# Patient Record
(demographics unavailable — no encounter records)

---

## 2024-10-30 NOTE — DATA REVIEWED
[MRI] : MRI [Lumbar Spine] : lumbar spine [Report was reviewed and noted in the chart] : The report was reviewed and noted in the chart [I independently reviewed and interpreted images and report] : I independently reviewed and interpreted images and report [FreeTextEntry1] : 8/6/2024 MRI Lumbar Spine O&C L2-L3: diffuse disc bulging, mild facet hypertrophy, mild bilateral NF narrowing L3-L4: diffuse disc bulging, bony ridging, broad based posterior disc herniation asymmetric to the left, bilateral facet hypertrophy, mild central stenosis, left > right NF narrowing, LEFT > right L3 nerve root impingement, progressed from previous study L4-L5: diffuse disc bulging, broad based posterior disc herniation asymmetric to right w/ bilateral facet hypertrophy, mild central stenosis, RIGHT > left L5 nerve root impingement, RIGHT > left L4 nerve root impingement with right > left NF stenosis L5-S1: disc bulging, bony ridging, broad based posterior disc herniation impinging on the RIGHT > left S1 nerve roots and RIGHT > left L5 nerve root impingement with right > left NF narrowing

## 2024-10-30 NOTE — PHYSICAL EXAM
[de-identified] : Gen: NAD Head: NC/AT Eyes: wears glasses, no scleral icterus ENT: mucous membranes moist CV: No JVD Lungs: nonlabored breathing Abd: soft, NT/ND Ext: full ROM in all extremities, no peripheral edema Back: +TTP in the right low lumbar facet region, +facet loading on the right, +SLR on the right Neuro: CN intact LEs +5 L +5 R hip flexion +5 L +5 R leg extension +5 L +5 R leg flexion +5 L +5 R foot dorsiflexion +5 L +5 R foot plantarflexion +5 L +5 R EHL extension Psych: normal affect Skin: no visible lesions

## 2024-10-30 NOTE — DISCUSSION/SUMMARY
[de-identified] : ROSALIO KENNEY is a 55 year-old man presenting for a RPV for a history of chronic low back pain.    Prior treatment: 10/16/2024: RIGHT L3-L4, L4-L5 TFESI w/o MAC w/ 90% improvement for 10-12 days, now 50% improvement  Physical therapy x2 months Lumbar TATIANA Meloxicam Oral steroid taper Patient has participated and failed at least 6 weeks of conservative therapy including physical therapy and a prescribed home exercise program as well as 6 weeks of activity modifications, including heat, ice, rest, and over the counter medications.  Plan: 1) MRI lumbar spine images reviewed with the patient. 2) Schedule L4-L5 ILESI w/o MAC. The procedure was explained to the patient in detail. Reviewed risks, benefits, and alternatives with the patient. Some risks discussed included temporary increase in pain, bleeding, infection, and side effects from steroids. The patient expressed understanding and would like to proceed. 3) Continue acetaminophen prn 4) Continue home exercise regimen 5) RTC post procedure

## 2024-10-30 NOTE — DISCUSSION/SUMMARY
[de-identified] : ROSALIO KENNEY is a 55 year-old man presenting for a RPV for a history of chronic low back pain.    Prior treatment: 10/16/2024: RIGHT L3-L4, L4-L5 TFESI w/o MAC w/ 90% improvement for 10-12 days, now 50% improvement  Physical therapy x2 months Lumbar TATIANA Meloxicam Oral steroid taper Patient has participated and failed at least 6 weeks of conservative therapy including physical therapy and a prescribed home exercise program as well as 6 weeks of activity modifications, including heat, ice, rest, and over the counter medications.  Plan: 1) MRI lumbar spine images reviewed with the patient. 2) Schedule L4-L5 ILESI w/o MAC. The procedure was explained to the patient in detail. Reviewed risks, benefits, and alternatives with the patient. Some risks discussed included temporary increase in pain, bleeding, infection, and side effects from steroids. The patient expressed understanding and would like to proceed. 3) Continue acetaminophen prn 4) Continue home exercise regimen 5) RTC post procedure

## 2024-10-30 NOTE — PHYSICAL EXAM
[de-identified] : Gen: NAD Head: NC/AT Eyes: wears glasses, no scleral icterus ENT: mucous membranes moist CV: No JVD Lungs: nonlabored breathing Abd: soft, NT/ND Ext: full ROM in all extremities, no peripheral edema Back: +TTP in the right low lumbar facet region, +facet loading on the right, +SLR on the right Neuro: CN intact LEs +5 L +5 R hip flexion +5 L +5 R leg extension +5 L +5 R leg flexion +5 L +5 R foot dorsiflexion +5 L +5 R foot plantarflexion +5 L +5 R EHL extension Psych: normal affect Skin: no visible lesions

## 2024-10-30 NOTE — HISTORY OF PRESENT ILLNESS
[Lower back] : lower back [Left Leg] : left leg [Right Leg] : right leg [5] : 5 [Burning] : burning [Dull/Aching] : dull/aching [Radiating] : radiating [Sharp] : sharp [Tightness] : tightness [Tingling] : tingling [Intermittent] : intermittent [Household chores] : household chores [Leisure] : leisure [Sleep] : sleep [Nothing helps with pain getting better] : Nothing helps with pain getting better [Sitting] : sitting [Standing] : standing [Physical therapy] : physical therapy [3] : 3 [FreeTextEntry1] : 10/30/2024: ROSALIO KENNEY is a 55 year-old man presenting for a RPV for a history of chronic low back pain. The patient underwent a RIGHT L3-L4, L4-L5 TFESI w/o MAC on 10/16/2024. Patient notes 90% improvement of the pain for 10-12 days but notes that the pain has returned over the past few days. Still notes pain and numbness in the right anterolateral thigh. Still has numbness over this distribution.  The patient states that average pain over the past week was 3/10 in severity. Mood: No depression or anxiety.  Sleep: Patient notes difficulty with sleep initiation and maintenance 2/2 pain.  9/13/2024 ROSALIO KENNEY is a 55 year-old man presenting for a NPV for a history of chronic low back pain. The patient notes a 25+ year history of low back pain with RLE radicular symptoms. The patient notes that pain has gotten progressively worse over time. At this point, the patient endorses a right sided low back pain with radiation to the right anterolateral thigh and anterior leg to the great toe. He notes numbness and tingling over this distribution. No focal weakness, bowel or bladder incontinence or saddle anesthesia. Pain is worse with sitting for long periods and laying down.  The patient states that average pain over the past week was 4/10 in severity. Mood: No depression or anxiety.  Sleep: Patient notes difficulty with sleep initiation and maintenance 2/2 pain. He has to sleep on a couch.     [] : no [FreeTextEntry6] : numbness and tingling in b/l legs  [FreeTextEntry7] : b/l legs  [de-identified] : x-ray and MRI [TWNoteComboBox1] : 0%

## 2024-10-30 NOTE — HISTORY OF PRESENT ILLNESS
[Lower back] : lower back [Left Leg] : left leg [Right Leg] : right leg [5] : 5 [Burning] : burning [Dull/Aching] : dull/aching [Radiating] : radiating [Sharp] : sharp [Tightness] : tightness [Tingling] : tingling [Intermittent] : intermittent [Household chores] : household chores [Leisure] : leisure [Sleep] : sleep [Nothing helps with pain getting better] : Nothing helps with pain getting better [Sitting] : sitting [Standing] : standing [Physical therapy] : physical therapy [3] : 3 [FreeTextEntry1] : 10/30/2024: ROSALIO KENNEY is a 55 year-old man presenting for a RPV for a history of chronic low back pain. The patient underwent a RIGHT L3-L4, L4-L5 TFESI w/o MAC on 10/16/2024. Patient notes 90% improvement of the pain for 10-12 days but notes that the pain has returned over the past few days. Still notes pain and numbness in the right anterolateral thigh. Still has numbness over this distribution.  The patient states that average pain over the past week was 3/10 in severity. Mood: No depression or anxiety.  Sleep: Patient notes difficulty with sleep initiation and maintenance 2/2 pain.  9/13/2024 ROSALIO KENNEY is a 55 year-old man presenting for a NPV for a history of chronic low back pain. The patient notes a 25+ year history of low back pain with RLE radicular symptoms. The patient notes that pain has gotten progressively worse over time. At this point, the patient endorses a right sided low back pain with radiation to the right anterolateral thigh and anterior leg to the great toe. He notes numbness and tingling over this distribution. No focal weakness, bowel or bladder incontinence or saddle anesthesia. Pain is worse with sitting for long periods and laying down.  The patient states that average pain over the past week was 4/10 in severity. Mood: No depression or anxiety.  Sleep: Patient notes difficulty with sleep initiation and maintenance 2/2 pain. He has to sleep on a couch.     [] : no [FreeTextEntry6] : numbness and tingling in b/l legs  [FreeTextEntry7] : b/l legs  [de-identified] : x-ray and MRI [TWNoteComboBox1] : 0%

## 2024-11-20 NOTE — PROCEDURE
[FreeTextEntry1] : L4-L5 lumbar interlaminar epidural steroid injection [FreeTextEntry2] : chronic lumbar radiculopathy  [FreeTextEntry3] : Procedure Date: 11/20/2024   Preoperative Diagnosis: chronic low back pain with bilateral sciatica   Procedure: L4-L5 epidural steroid injection under fluoroscopic guidance   Anesthesia: local   Complications: none   EBL: none   Procedure in detail: Patient was seen and examined. Risks, benefits, and alternatives for the procedure were discussed with the patient in detail. The patient expressed understanding, gave written and verbal consent, and placed themselves in a prone position on the procedure table. Skin overlying the lumbosacral spine was prepped with chloraprep and draped in the usual sterile fashion. Fluoroscopic images were obtained to identify the L4 and L5 vertebral body. Target was the interlaminar space between the L4 and L5 vertebral body. Skin overlying the target was marked and infiltrated with 1% lidocaine. Using an 20 gauge, 9cm Tuohy needle, this was inserted and advanced under intermittent fluoroscopic guidance. When felt to be engaged in the epidural space, lateral view was used to confirm depth. Needle was advanced until loss of resistance to saline was achieved. After negative aspiration for heme/csf, contrast was injected under live fluoroscopy, which showed contrast spread consistent with epidural flow. No evidence of intravascular or intrathecal uptake. At this point, a total of 3ml of injectate, which consisted of 1ml of 80mg/ml depomedrol and 2ml of normal saline were injected. The needle was restyletted and withdrawn, a band aid was placed over the injection site. Patient tolerated the procedure well. The patient recovered uneventfully and was discharged home in stable condition.

## 2024-12-06 NOTE — HISTORY OF PRESENT ILLNESS
[Lower back] : lower back [3] : 3 [Sitting] : sitting [Lying in bed] : lying in bed [de-identified] : 12/6/24: patient is here for a follow up of lower back pain. They have had two epidural injection in the back. The first set had improvement but wore off and then did another set of epidurals and had no significant change. The pain has not improved.   8/21/24- Lumbar MRI f/u.  08/02/2024: Returning today with worsening lower back pain. Completed PT in the past with some relief at the time. No relief with Meloxicam. Reports constant numbness of RT thigh and occasionally in LT thigh.   8/10/23: MRI results today, has not started PT yet. Pain improved slightly with medications .  7/24/23: 55yo male presents today with complaints of low back tightness/pain with anterior thigh numbness with right radicular pain. Patinet also reports intermittent LLE radicular pain/numbness which is more positional in nature improved with adjustment.  Patient reports symptoms have been present for several years, with no acute injury/trauma. Patient reports symptoms worsen when supine and limits sleep, also has difficulty with sitting.  Patient has gong to chiro, pain management receiving injections with minimal relief and Taking ibuprofen with some relief. Denies changes in b/b function.  Tried 1 TATIANA years ago without any relief.    3 weeks had a flare up was taking ibuprofen.  Has tried TEns.  PmHx: BPH, Asthma  Occupation:   Xrays completed L 4V L4/5, L5/S1 disc narrowing stable from 2023    [] : no

## 2024-12-06 NOTE — ASSESSMENT
[FreeTextEntry1] : 54 M with Low back pain and BLLE radiculopathy with severe foraminal stenosis on MRI from 2016 Discussed treatment options including PT, nsaids, muscle relaxants injections surgery. Pain worsening now moving into left leg.   MRI with L4-5 and L5-s1 Disc height also foraminal stenosis and lateral recess stenosis Pain management referral for Laz consider L3-s1 laminectomy L4-S1 TLIF if pain/numbness persists  RLE radic partially improved after EIS Consider MBB or repeat TF LAZ Also possible candidate for intracept  FU 6 weeks

## 2024-12-06 NOTE — DATA REVIEWED
[MRI] : MRI [Lumbar Spine] : lumbar spine [I independently reviewed and interpreted images and report] : I independently reviewed and interpreted images and report [FreeTextEntry1] : Multilevel degen changes with worst at R L5-s1 HNP [FreeTextEntry2] : FUll report in chart

## 2024-12-06 NOTE — IMAGING
[de-identified] : MRI form 2016 with mulitlevel degen changes sever foraminal stenosis at L4-5 and L5-S1.

## 2024-12-31 NOTE — HISTORY OF PRESENT ILLNESS
[Lower back] : lower back [Left Leg] : left leg [Right Leg] : right leg [3] : 3 [Burning] : burning [Dull/Aching] : dull/aching [Radiating] : radiating [Sharp] : sharp [Tightness] : tightness [Tingling] : tingling [Intermittent] : intermittent [Household chores] : household chores [Leisure] : leisure [Sleep] : sleep [Nothing helps with pain getting better] : Nothing helps with pain getting better [Sitting] : sitting [Standing] : standing [Physical therapy] : physical therapy [5] : 5 [FreeTextEntry1] : 12/31/2024 ROSALIO KENNEY is a 55 year-old man presenting for a RPV for a history of chronic low back pain. On 11/20/2024, the patient underwent an L4-L5 LESI w/o MAC. Notes moderate improvement of pain since the procedure. He continues to have a right-sided low back pain with radiation to the medial aspect of the right thigh.  The patient states that average pain over the past week was 4/10 in severity. Mood: No depression or anxiety.  Sleep: Patient notes difficulty with sleep initiation and maintenance 2/2 pain.  10/30/2024: ROSALIO KENNEY is a 55 year-old man presenting for a RPV for a history of chronic low back pain. The patient underwent a RIGHT L3-L4, L4-L5 TFESI w/o MAC on 10/16/2024. Patient notes 90% improvement of the pain for 10-12 days but notes that the pain has returned over the past few days. Still notes pain and numbness in the right anterolateral thigh. Still has numbness over this distribution.  The patient states that average pain over the past week was 3/10 in severity. Mood: No depression or anxiety.  Sleep: Patient notes difficulty with sleep initiation and maintenance 2/2 pain.  9/13/2024 ROSALIO KENNEY is a 55 year-old man presenting for a NPV for a history of chronic low back pain. The patient notes a 25+ year history of low back pain with RLE radicular symptoms. The patient notes that pain has gotten progressively worse over time. At this point, the patient endorses a right sided low back pain with radiation to the right anterolateral thigh and anterior leg to the great toe. He notes numbness and tingling over this distribution. No focal weakness, bowel or bladder incontinence or saddle anesthesia. Pain is worse with sitting for long periods and laying down.  The patient states that average pain over the past week was 4/10 in severity. Mood: No depression or anxiety.  Sleep: Patient notes difficulty with sleep initiation and maintenance 2/2 pain. He has to sleep on a couch.     [] : no [FreeTextEntry7] : b/l legs  [FreeTextEntry6] : numbness and tingling in b/l legs  [de-identified] : x-ray and MRI [TWNoteComboBox1] : 0%

## 2024-12-31 NOTE — PHYSICAL EXAM
[de-identified] : Gen: NAD Head: NC/AT Eyes: wears glasses, no scleral icterus ENT: mucous membranes moist CV: No JVD Lungs: nonlabored breathing Abd: soft, NT/ND Ext: full ROM in all extremities, no peripheral edema Back: +TTP in the right low lumbar facet region, +facet loading on the right, +SLR on the right Neuro: CN intact LEs +5 L +5 R hip flexion +5 L +5 R leg extension +5 L +5 R leg flexion +5 L +5 R foot dorsiflexion +5 L +5 R foot plantarflexion +5 L +5 R EHL extension Psych: normal affect Skin: no visible lesions

## 2024-12-31 NOTE — DISCUSSION/SUMMARY
[de-identified] : ROSALIO KENNEY is a 55 year-old man presenting for a RPV for a history of chronic low back pain.    Prior treatment: 11/20/2024 L4-L5 LESI w/o MAC w/ modest improvement  10/16/2024: RIGHT L3-L4, L4-L5 TFESI w/o MAC w/ 90% improvement for 10-12 days, now 50% improvement  Physical therapy x2 months Lumbar TATIANA Meloxicam Oral steroid taper Patient has participated and failed at least 6 weeks of conservative therapy including physical therapy and a prescribed home exercise program as well as 6 weeks of activity modifications, including heat, ice, rest, and over the counter medications.  Plan: 1) MRI lumbar spine images reviewed with the patient. 2) Schedule RIGHT L3-L4, L4-L5 TFESI w/o MAC. The procedure was explained to the patient in detail. Reviewed risks, benefits, and alternatives with the patient. Some risks discussed included temporary increase in pain, bleeding, infection, and side effects from steroids. The patient expressed understanding and would like to proceed. 3) Continue acetaminophen prn 4) Continue home exercise regimen 5) RTC post procedure

## 2024-12-31 NOTE — HISTORY OF PRESENT ILLNESS
[Lower back] : lower back [Left Leg] : left leg [Right Leg] : right leg [3] : 3 [Burning] : burning [Dull/Aching] : dull/aching [Radiating] : radiating [Sharp] : sharp [Tightness] : tightness [Tingling] : tingling [Intermittent] : intermittent [Household chores] : household chores [Leisure] : leisure [Sleep] : sleep [Nothing helps with pain getting better] : Nothing helps with pain getting better [Sitting] : sitting [Standing] : standing [Physical therapy] : physical therapy [5] : 5 [FreeTextEntry1] : 12/31/2024 ROSALIO KENNEY is a 55 year-old man presenting for a RPV for a history of chronic low back pain. On 11/20/2024, the patient underwent an L4-L5 LESI w/o MAC. Notes moderate improvement of pain since the procedure. He continues to have a right-sided low back pain with radiation to the medial aspect of the right thigh.  The patient states that average pain over the past week was 4/10 in severity. Mood: No depression or anxiety.  Sleep: Patient notes difficulty with sleep initiation and maintenance 2/2 pain.  10/30/2024: ROSALIO KENNEY is a 55 year-old man presenting for a RPV for a history of chronic low back pain. The patient underwent a RIGHT L3-L4, L4-L5 TFESI w/o MAC on 10/16/2024. Patient notes 90% improvement of the pain for 10-12 days but notes that the pain has returned over the past few days. Still notes pain and numbness in the right anterolateral thigh. Still has numbness over this distribution.  The patient states that average pain over the past week was 3/10 in severity. Mood: No depression or anxiety.  Sleep: Patient notes difficulty with sleep initiation and maintenance 2/2 pain.  9/13/2024 ROSALIO KENNEY is a 55 year-old man presenting for a NPV for a history of chronic low back pain. The patient notes a 25+ year history of low back pain with RLE radicular symptoms. The patient notes that pain has gotten progressively worse over time. At this point, the patient endorses a right sided low back pain with radiation to the right anterolateral thigh and anterior leg to the great toe. He notes numbness and tingling over this distribution. No focal weakness, bowel or bladder incontinence or saddle anesthesia. Pain is worse with sitting for long periods and laying down.  The patient states that average pain over the past week was 4/10 in severity. Mood: No depression or anxiety.  Sleep: Patient notes difficulty with sleep initiation and maintenance 2/2 pain. He has to sleep on a couch.     [] : no [FreeTextEntry7] : b/l legs  [FreeTextEntry6] : numbness and tingling in b/l legs  [de-identified] : x-ray and MRI [TWNoteComboBox1] : 0%

## 2024-12-31 NOTE — DISCUSSION/SUMMARY
[de-identified] : ROSALIO KENNEY is a 55 year-old man presenting for a RPV for a history of chronic low back pain.    Prior treatment: 11/20/2024 L4-L5 LESI w/o MAC w/ modest improvement  10/16/2024: RIGHT L3-L4, L4-L5 TFESI w/o MAC w/ 90% improvement for 10-12 days, now 50% improvement  Physical therapy x2 months Lumbar TATIANA Meloxicam Oral steroid taper Patient has participated and failed at least 6 weeks of conservative therapy including physical therapy and a prescribed home exercise program as well as 6 weeks of activity modifications, including heat, ice, rest, and over the counter medications.  Plan: 1) MRI lumbar spine images reviewed with the patient. 2) Schedule RIGHT L3-L4, L4-L5 TFESI w/o MAC. The procedure was explained to the patient in detail. Reviewed risks, benefits, and alternatives with the patient. Some risks discussed included temporary increase in pain, bleeding, infection, and side effects from steroids. The patient expressed understanding and would like to proceed. 3) Continue acetaminophen prn 4) Continue home exercise regimen 5) RTC post procedure

## 2024-12-31 NOTE — PHYSICAL EXAM
[de-identified] : Gen: NAD Head: NC/AT Eyes: wears glasses, no scleral icterus ENT: mucous membranes moist CV: No JVD Lungs: nonlabored breathing Abd: soft, NT/ND Ext: full ROM in all extremities, no peripheral edema Back: +TTP in the right low lumbar facet region, +facet loading on the right, +SLR on the right Neuro: CN intact LEs +5 L +5 R hip flexion +5 L +5 R leg extension +5 L +5 R leg flexion +5 L +5 R foot dorsiflexion +5 L +5 R foot plantarflexion +5 L +5 R EHL extension Psych: normal affect Skin: no visible lesions

## 2025-02-04 NOTE — PHYSICAL EXAM
[General Appearance - Well Developed] : well developed [General Appearance - Well Nourished] : well nourished [Normal Appearance] : normal appearance [Well Groomed] : well groomed [General Appearance - In No Acute Distress] : no acute distress [Abdomen Tenderness] : non-tender [Abdomen Soft] : soft [Costovertebral Angle Tenderness] : no ~M costovertebral angle tenderness [Urethral Meatus] : meatus normal [Penis Abnormality] : normal circumcised penis [Scrotum] : the scrotum was normal [Epididymis] : the epididymides were normal [Testes Tenderness] : no tenderness of the testes [Testes Mass (___cm)] : there were no testicular masses [Anus Abnormality] : the anus and perineum were normal [Rectal Exam - Rectum] : digital rectal exam was normal [Prostate Tenderness] : the prostate was not tender [No Prostate Nodules] : no prostate nodules [Prostate Size ___ (0-4)] : prostate size [unfilled] (scale: 0-4)

## 2025-02-04 NOTE — PHYSICAL EXAM
[General Appearance - Well Developed] : well developed [General Appearance - Well Nourished] : well nourished [Normal Appearance] : normal appearance [Well Groomed] : well groomed [General Appearance - In No Acute Distress] : no acute distress [Abdomen Soft] : soft [Abdomen Tenderness] : non-tender [Costovertebral Angle Tenderness] : no ~M costovertebral angle tenderness [Urethral Meatus] : meatus normal [Penis Abnormality] : normal circumcised penis [Scrotum] : the scrotum was normal [Epididymis] : the epididymides were normal [Testes Tenderness] : no tenderness of the testes [Anus Abnormality] : the anus and perineum were normal [Testes Mass (___cm)] : there were no testicular masses [Rectal Exam - Rectum] : digital rectal exam was normal [Prostate Tenderness] : the prostate was not tender [No Prostate Nodules] : no prostate nodules [Prostate Size ___ (0-4)] : prostate size [unfilled] (scale: 0-4)

## 2025-02-05 NOTE — ASSESSMENT
[FreeTextEntry1] : 55 year old male with LUTS here today for TRUS. Nocturia 3x. Denies hematuria, dysuria, flank pain, or any other urinary issues LIAM Occasionally eating spicy, citrus, chocolate. Adequately hydrating   FHx of PCA: negative Tobacco use: negative Last PSA: 1.09ng/mL (07/30/2024) Last creatinine: 1.24mg/dL (07/30/2024) Last UCx: negative (01/22/2024) Uro meds: Flomax 0.4, Dutasteride  07/30/2024 - TRUS Prostate Volume: 51.6 cc Length : 54.2 mm Width : 49.5 mm Height : 36.8 mm Impression: enlarged prostate gland.  02/05/2025 - TRUS Prostate Volume: 45 cc Length : 47 mm Width : 48 mm Height : 38 mm Impression: decreased in size from prior US  New TRUS is discussed with the patient. Meds are renewed for 6 months. Will F/U in 6 months with TRUS

## 2025-02-05 NOTE — HISTORY OF PRESENT ILLNESS
[FreeTextEntry1] : 55 year old male with LUTS here today for TRUS. Nocturia 3x. Denies hematuria, dysuria, flank pain, or any other urinary issues LIAM Occasionally eating spicy, citrus, chocolate. Adequately hydrating   FHx of PCA: negative Tobacco use: negative Last PSA: 1.09ng/mL (07/30/2024) Last creatinine: 1.24mg/dL (07/30/2024) Last UCx: negative (01/22/2024) Uro meds: Flomax 0.4, Dutasteride  07/30/2024 - TRUS Prostate Volume: 51.6 cc Length : 54.2 mm Width : 49.5 mm Height : 36.8 mm Impression: enlarged prostate gland.

## 2025-03-05 NOTE — PROCEDURE
[FreeTextEntry1] : RIGHT L3-L4, L4-L5 transforaminal epidural steroid injection [FreeTextEntry2] : chronic lumbar radiculopathy [FreeTextEntry3] : Procedure Date: 03/05/2025   Preoperative Diagnosis: chronic lumbar radiculopathy   Procedure: RIGHT L3-L4, L4-L5 transforaminal epidural steroid injection under fluoroscopic guidance  Anesthesia: local  Complications: none   EBL: none   Procedure in detail:   Patient was seen and examined. Risks, benefits, and alternatives for the procedure were discussed with the patient in detail. The patient expressed understanding, gave written and verbal consent, and placed themselves in a prone position on the procedure table. Skin overlying the lumbosacral spine was prepped with chloraprep and draped in the usual sterile fashion. Fluoroscopic images were obtained to identify the L3 and L4 vertebral bodies. Target was the 6 o'clock position under the RIGHT pedicle at the L3 and L4 vertebral level. Skin overlying the target was marked and infiltrated with 1% lidocaine. Using two 22 gauge, 3.5 inch spinal needles, these were inserted and advanced under intermittent fluoroscopic guidance. When felt to be engaged in the epidural space, lateral view was used to confirm depth. After negative aspiration for heme/csf, contrast was injected under live fluoroscopy, which showed contrast spread consistent with epidural flow. No evidence of intravascular or intrathecal uptake. At this point, a total of 2ml of injectate, which consisted of 1ml of 6mg/ml betamethasone and 1ml of normal saline were injected through each needle. The needles were restyletted and withdrawn, a band aid was placed over the injection site. Patient tolerated the procedure well. The patient recovered uneventfully and was discharged home in stable condition.

## 2025-03-18 NOTE — HISTORY OF PRESENT ILLNESS
[Lower back] : lower back [Left Leg] : left leg [Right Leg] : right leg [3] : 3 [5] : 5 [Burning] : burning [Dull/Aching] : dull/aching [Radiating] : radiating [Sharp] : sharp [Tightness] : tightness [Tingling] : tingling [Intermittent] : intermittent [Household chores] : household chores [Leisure] : leisure [Sleep] : sleep [Nothing helps with pain getting better] : Nothing helps with pain getting better [Sitting] : sitting [Standing] : standing [Physical therapy] : physical therapy [1] : 2 [FreeTextEntry1] : 3/18/2025 ROSALIO KENNEY is a 56 year-old man presenting for a RPV for a history of chronic low back pain. On 3/5/2025, the patient underwent a RIGHT L3-L4, L4-L5 TFESI w/o MAC. The patient notes having 80% improvement of pain and function since the procedure. Notes improvement of ADLs. He notes that, when standing for more than a few minutes, notes pain in the right anterior thigh. No focal numbness or weakness in the lower extremities. No bowel or bladder incontinence or saddle anesthesia. The patient states that average pain over the past week was 2/10 in severity. Mood: No depression or anxiety.  Sleep: Patient notes some improvement of sleep since the procedure.   12/31/2024 ROSALIO KENNEY is a 55 year-old man presenting for a RPV for a history of chronic low back pain. On 11/20/2024, the patient underwent an L4-L5 LESI w/o MAC. Notes moderate improvement of pain since the procedure. He continues to have a right-sided low back pain with radiation to the medial aspect of the right thigh.  The patient states that average pain over the past week was 4/10 in severity. Mood: No depression or anxiety.  Sleep: Patient notes difficulty with sleep initiation and maintenance 2/2 pain.  10/30/2024: ROSALIO KENNEY is a 55 year-old man presenting for a RPV for a history of chronic low back pain. The patient underwent a RIGHT L3-L4, L4-L5 TFESI w/o MAC on 10/16/2024. Patient notes 90% improvement of the pain for 10-12 days but notes that the pain has returned over the past few days. Still notes pain and numbness in the right anterolateral thigh. Still has numbness over this distribution.  The patient states that average pain over the past week was 3/10 in severity. Mood: No depression or anxiety.  Sleep: Patient notes difficulty with sleep initiation and maintenance 2/2 pain.  9/13/2024 ROSALIO KENNEY is a 55 year-old man presenting for a NPV for a history of chronic low back pain. The patient notes a 25+ year history of low back pain with RLE radicular symptoms. The patient notes that pain has gotten progressively worse over time. At this point, the patient endorses a right sided low back pain with radiation to the right anterolateral thigh and anterior leg to the great toe. He notes numbness and tingling over this distribution. No focal weakness, bowel or bladder incontinence or saddle anesthesia. Pain is worse with sitting for long periods and laying down.  The patient states that average pain over the past week was 4/10 in severity. Mood: No depression or anxiety.  Sleep: Patient notes difficulty with sleep initiation and maintenance 2/2 pain. He has to sleep on a couch.     [] : no [FreeTextEntry6] : numbness and tingling in b/l legs  [FreeTextEntry7] : b/l legs  [de-identified] : x-ray and MRI [TWNoteComboBox1] : 80%

## 2025-03-18 NOTE — PHYSICAL EXAM
[de-identified] : Gen: NAD Head: NC/AT Eyes: wears glasses, no scleral icterus ENT: mucous membranes moist CV: No JVD Lungs: nonlabored breathing Abd: soft, NT/ND Ext: full ROM in all extremities, no peripheral edema Back: +TTP in the right low lumbar facet region, +facet loading on the right, +SLR on the right Neuro: CN intact LEs +5 L +5 R hip flexion +5 L +5 R leg extension +5 L +5 R leg flexion +5 L +5 R foot dorsiflexion +5 L +5 R foot plantarflexion +5 L +5 R EHL extension Psych: normal affect Skin: no visible lesions

## 2025-03-18 NOTE — DISCUSSION/SUMMARY
[de-identified] : ROSALIO KENNEY is a 56 year-old man presenting for a RPV for a history of chronic low back pain.    Prior treatment: 3/5/2025 RIGHT L3-L4, L4-L5 TFESI w/o MAC w/ 80% improvement of pain and function  11/20/2024 L4-L5 LESI w/o MAC w/ modest improvement  10/16/2024: RIGHT L3-L4, L4-L5 TFESI w/o MAC w/ 90% improvement for 10-12 days, now 50% improvement  Physical therapy x2 months Lumbar TATIANA Meloxicam Oral steroid taper Patient has participated and failed at least 6 weeks of conservative therapy including physical therapy and a prescribed home exercise program as well as 6 weeks of activity modifications, including heat, ice, rest, and over the counter medications.  Plan: 1) MRI lumbar spine images reviewed with the patient. 2) Consider repeat RIGHT L3-L4, L4-L5 TFESI w/o MAC in the future 3) Continue acetaminophen prn 4) Discussed a variety of exercises and regular stretches and printed out various extension/flexion exercises and core strengthening exercises. Patient will start a daily stretching routine and eventually resume cardio workout. Also discussed the importance of stress reduction and good sleep hygiene. 5) RTC prn

## 2025-03-18 NOTE — DISCUSSION/SUMMARY
[de-identified] : ROSALIO KENNEY is a 56 year-old man presenting for a RPV for a history of chronic low back pain.    Prior treatment: 3/5/2025 RIGHT L3-L4, L4-L5 TFESI w/o MAC w/ 80% improvement of pain and function  11/20/2024 L4-L5 LESI w/o MAC w/ modest improvement  10/16/2024: RIGHT L3-L4, L4-L5 TFESI w/o MAC w/ 90% improvement for 10-12 days, now 50% improvement  Physical therapy x2 months Lumbar TATIANA Meloxicam Oral steroid taper Patient has participated and failed at least 6 weeks of conservative therapy including physical therapy and a prescribed home exercise program as well as 6 weeks of activity modifications, including heat, ice, rest, and over the counter medications.  Plan: 1) MRI lumbar spine images reviewed with the patient. 2) Consider repeat RIGHT L3-L4, L4-L5 TFESI w/o MAC in the future 3) Continue acetaminophen prn 4) Discussed a variety of exercises and regular stretches and printed out various extension/flexion exercises and core strengthening exercises. Patient will start a daily stretching routine and eventually resume cardio workout. Also discussed the importance of stress reduction and good sleep hygiene. 5) RTC prn

## 2025-03-18 NOTE — HISTORY OF PRESENT ILLNESS
[Lower back] : lower back [Left Leg] : left leg [Right Leg] : right leg [3] : 3 [5] : 5 [Burning] : burning [Dull/Aching] : dull/aching [Radiating] : radiating [Sharp] : sharp [Tightness] : tightness [Tingling] : tingling [Intermittent] : intermittent [Household chores] : household chores [Leisure] : leisure [Sleep] : sleep [Nothing helps with pain getting better] : Nothing helps with pain getting better [Sitting] : sitting [Standing] : standing [Physical therapy] : physical therapy [1] : 2 [FreeTextEntry1] : 3/18/2025 ROSALIO KENNEY is a 56 year-old man presenting for a RPV for a history of chronic low back pain. On 3/5/2025, the patient underwent a RIGHT L3-L4, L4-L5 TFESI w/o MAC. The patient notes having 80% improvement of pain and function since the procedure. Notes improvement of ADLs. He notes that, when standing for more than a few minutes, notes pain in the right anterior thigh. No focal numbness or weakness in the lower extremities. No bowel or bladder incontinence or saddle anesthesia. The patient states that average pain over the past week was 2/10 in severity. Mood: No depression or anxiety.  Sleep: Patient notes some improvement of sleep since the procedure.   12/31/2024 ROSALIO KENNEY is a 55 year-old man presenting for a RPV for a history of chronic low back pain. On 11/20/2024, the patient underwent an L4-L5 LESI w/o MAC. Notes moderate improvement of pain since the procedure. He continues to have a right-sided low back pain with radiation to the medial aspect of the right thigh.  The patient states that average pain over the past week was 4/10 in severity. Mood: No depression or anxiety.  Sleep: Patient notes difficulty with sleep initiation and maintenance 2/2 pain.  10/30/2024: ROSALIO KENNEY is a 55 year-old man presenting for a RPV for a history of chronic low back pain. The patient underwent a RIGHT L3-L4, L4-L5 TFESI w/o MAC on 10/16/2024. Patient notes 90% improvement of the pain for 10-12 days but notes that the pain has returned over the past few days. Still notes pain and numbness in the right anterolateral thigh. Still has numbness over this distribution.  The patient states that average pain over the past week was 3/10 in severity. Mood: No depression or anxiety.  Sleep: Patient notes difficulty with sleep initiation and maintenance 2/2 pain.  9/13/2024 ROSALIO KENNEY is a 55 year-old man presenting for a NPV for a history of chronic low back pain. The patient notes a 25+ year history of low back pain with RLE radicular symptoms. The patient notes that pain has gotten progressively worse over time. At this point, the patient endorses a right sided low back pain with radiation to the right anterolateral thigh and anterior leg to the great toe. He notes numbness and tingling over this distribution. No focal weakness, bowel or bladder incontinence or saddle anesthesia. Pain is worse with sitting for long periods and laying down.  The patient states that average pain over the past week was 4/10 in severity. Mood: No depression or anxiety.  Sleep: Patient notes difficulty with sleep initiation and maintenance 2/2 pain. He has to sleep on a couch.     [] : no [FreeTextEntry6] : numbness and tingling in b/l legs  [FreeTextEntry7] : b/l legs  [de-identified] : x-ray and MRI [TWNoteComboBox1] : 80%

## 2025-03-18 NOTE — PHYSICAL EXAM
[de-identified] : Gen: NAD Head: NC/AT Eyes: wears glasses, no scleral icterus ENT: mucous membranes moist CV: No JVD Lungs: nonlabored breathing Abd: soft, NT/ND Ext: full ROM in all extremities, no peripheral edema Back: +TTP in the right low lumbar facet region, +facet loading on the right, +SLR on the right Neuro: CN intact LEs +5 L +5 R hip flexion +5 L +5 R leg extension +5 L +5 R leg flexion +5 L +5 R foot dorsiflexion +5 L +5 R foot plantarflexion +5 L +5 R EHL extension Psych: normal affect Skin: no visible lesions